# Patient Record
Sex: MALE | Race: BLACK OR AFRICAN AMERICAN | URBAN - METROPOLITAN AREA
[De-identification: names, ages, dates, MRNs, and addresses within clinical notes are randomized per-mention and may not be internally consistent; named-entity substitution may affect disease eponyms.]

---

## 2017-03-29 ENCOUNTER — OFFICE VISIT (OUTPATIENT)
Dept: FAMILY MEDICINE | Facility: CLINIC | Age: 22
End: 2017-03-29

## 2017-03-29 VITALS
DIASTOLIC BLOOD PRESSURE: 84 MMHG | RESPIRATION RATE: 18 BRPM | BODY MASS INDEX: 29.03 KG/M2 | WEIGHT: 196 LBS | SYSTOLIC BLOOD PRESSURE: 135 MMHG | HEIGHT: 69 IN | OXYGEN SATURATION: 96 % | HEART RATE: 62 BPM | TEMPERATURE: 98.4 F

## 2017-03-29 DIAGNOSIS — R36.9 PENILE DISCHARGE: Primary | ICD-10-CM

## 2017-03-29 DIAGNOSIS — S99.911S RIGHT ANKLE INJURY, SEQUELA: ICD-10-CM

## 2017-03-29 RX ORDER — AZITHROMYCIN 500 MG/1
1000 TABLET, FILM COATED ORAL ONCE
Qty: 2 TABLET | Refills: 0 | Status: SHIPPED | OUTPATIENT
Start: 2017-03-29 | End: 2017-03-29

## 2017-03-29 NOTE — PROGRESS NOTES
"       HPI       Justyn Jacome is a 21 year old  male  who presents for follow up. He says he went to [facility]- March 14-19 [facility] for spring break and had unprotected sex . He says he began having Whitish penile discharge that has been yellow green that started 2.5 days ago.  No fevers or chills. He denies any other sexual partners since spring break,           Patient Active Problem List    Diagnosis Date Noted     NO ACTIVE PROBLEMS 04/19/2016     Priority: Medium       Current Outpatient Prescriptions   Medication Sig Dispense Refill     Fexofenadine HCl (ALLEGRA PO) Take 30 mg by mouth          No Known Allergies          +++++++      Problem, Medication and Allergy Lists were reviewed and updated if needed..    Patient is a new patient to this clinic and so  I reviewed/updated the Past Medical History, the Family History and the Social History. .         Review of Systems:   General: No distress  + penile discharge  No fevers no chills  No rashes                Physical Exam:     Vitals:    03/29/17 1405   BP: 135/84   BP Location: Left arm   Patient Position: Chair   Cuff Size: Adult Regular   Pulse: 62   Resp: 18   Temp: 98.4  F (36.9  C)   TempSrc: Oral   SpO2: 96%   Weight: 196 lb (88.9 kg)   Height: 5' 8.5\" (174 cm)     Body mass index is 29.37 kg/(m^2).    Constitutional: Awake, alert, cooperative, no apparent distress, and appears stated age  HEENT: MMM, no conjunctival pallor  Pulm: CTAB, No rales, No wheeze, no increased effort of breathing.  CVS: RRR, No murmurs   GI: S NT ND BS +ve  Hem/Onc: No cervical LN patria  MSK: right ankle without tenderness or deformity.    Skin : no  rash  Neuro:  No focal neurological deficit  Psych : Good eye contact, well groomed, very interactive and collaborative. Good insight. Thought process is linear and coherent. Associations are tight. Mood is good. Affect euthymic.         No results found for this or any previous visit (from the past 24 " hour(s)).    Assessment and Plan      1. Penile discharge likely an STI. Will treat at this point due to symptoms. handy sent to pharmacy. Rocephin given in clinic. Labs ordered. Recommended  re testing for HIV in 1 month. Advised refraining from unprotected sex for 1 week.   - Anti Treponema  - HIV Antigen Antibody Combo  - azithromycin (ZITHROMAX) 500 MG tablet; Take 2 tablets (1,000 mg) by mouth once for 1 dose  Dispense: 2 tablet; Refill: 0  - INJECTION INTRAMUSCULAR OR SUB-Q  - cefTRIAXone (ROCEPHIN) 250 mg vial, IM (Charge)  - cefTRIAXone (ROCEPHIN) 250 MG injection; Inject 250 mg into the vein once for 1 dose  Dispense: 2.5 mL; Refill: 0  - NEISSERIA GONORRHOEA PCR; Future  - CHLAMYDIA TRACHOMATIS PCR; Future  - Urinalysis, Micro If (UA) ([facility]); Future    2. Right ankle injury, sequela per patient,. One year ago. Ordered xray to assess for possible healed fracture. 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     - X-ray rt ankle G/E 3 views*; Future      There are no discontinued medications.    Options for treatment and follow-up care were reviewed with the patient. Justyn Jacome  engaged in the decision making process and verbalized understanding of the options discussed and agreed with the final plan.    Karla Knight MD G3  [facility]  Family Medicine Resident  Pager [phone]

## 2017-03-29 NOTE — LETTER
[facility]   [address] [place] [address],  [address]  [facility] [address]  [phone]        2017    Justyn Jacome  [address] [place] [id number]  [phone] (home)     :     1995          To Whom it May Concern:    This patient missed school 2017 due to a clinic visit.    Please contact me for questions or concerns at [phone].    Sincerely,        Karla Knight MD

## 2017-03-29 NOTE — NURSING NOTE
The following medication was given:     MEDICATION: Rocephin 250mg and Lidocaine 1cc  ROUTE: IM  SITE: Deltoid - Left  DOSE: 250 mg  LOT #: [id number]  :  [employer]  EXPIRATION DATE:  6/1/19  NDC#: [id number]     Was entire vial of medication used? Yes    Dr. Decker onsite and available for questions at time of injection.   Problem list reviewed for plan for this injection.   Xiomara Russo CMA

## 2017-03-29 NOTE — MR AVS SNAPSHOT
"              After Visit Summary   3/29/2017    Justyn Jacome    MRN: [id number]           Patient Information     Date Of Birth          1995        Visit Information        Provider Department      3/29/2017 2:00 PM Karla Knight MD [facility]        Today's Diagnoses     Penile discharge    -  1    Right ankle injury, sequela           Follow-ups after your visit        Who to contact     Please call your clinic at [phone] to:    Ask questions about your health    Make or cancel appointments    Discuss your medicines    Learn about your test results    Speak to your doctor   If you have compliments or concerns about an experience at your clinic, or if you wish to file a complaint, please contact [facility] Patient Relations at [phone] or email us at [email or web address]         Additional Information About Your Visit        MyChart Information     [employer] gives you secure access to your electronic health record. If you see a primary care provider, you can also send messages to your care team and make appointments. If you have questions, please call your primary care clinic.  If you do not have a primary care provider, please call [phone] and they will assist you.      Info is an electronic gateway that provides easy, online access to your medical records. With Info, you can request a clinic appointment, read your test results, renew a prescription or communicate with your care team.     To access your existing account, please contact your [facility] or call [phone] for assistance.        Care EveryWhere ID     This is your Care EveryWhere ID. This could be used by other organizations to access your [facility] medical records  [id number]        Your Vitals Were     Pulse Temperature Respirations Height Pulse Oximetry BMI (Body Mass Index)    62 98.4  F (36.9  C) (Oral) 18 5' 8.5\" (174 cm) " 96% 29.37 kg/m2       Blood Pressure from Last 3 Encounters:   03/29/17 135/84   04/19/16 130/86    Weight from Last 3 Encounters:   03/29/17 196 lb (88.9 kg)   04/19/16 199 lb 9.6 oz (90.5 kg)              We Performed the Following     Anti Treponema     cefTRIAXone (ROCEPHIN) 250 mg vial, IM (Charge)     HIV Antigen Antibody Combo     INJECTION INTRAMUSCULAR OR SUB-Q          Today's Medication Changes          These changes are accurate as of: 3/29/17 11:59 PM.  If you have any questions, ask your nurse or doctor.               Start taking these medicines.        Dose/Directions    azithromycin 500 MG tablet   Commonly known as:  ZITHROMAX   Used for:  Penile discharge   Started by:  Karla Knight MD        Dose:  1000 mg   Take 2 tablets (1,000 mg) by mouth once for 1 dose   Quantity:  2 tablet   Refills:  0       cefTRIAXone 250 MG injection   Commonly known as:  ROCEPHIN   Used for:  Penile discharge   Started by:  Karla Knight MD        Dose:  250 mg   Inject 250 mg into the vein once for 1 dose   Quantity:  2.5 mL   Refills:  0            Where to get your medicines      These medications were sent to [facility] [address] [place] [address] AT [address] [address]    Hours:  24-hours Phone:  [phone]     azithromycin 500 MG tablet         Some of these will need a paper prescription and others can be bought over the counter.  Ask your nurse if you have questions.     You don't need a prescription for these medications     cefTRIAXone 250 MG injection                Primary Care Provider Office Phone # Fax #    Alex Shannon [phone]       [facility] [address] [facility] [address]        Thank you!     Thank you for choosing [facility]  for your care. Our goal is always to provide you with excellent care. Hearing back from our patients is one way we can continue to  improve our services. Please take a few minutes to complete the written survey that you may receive in the mail after your visit with us. Thank you!             Your Updated Medication List - Protect others around you: Learn how to safely use, store and throw away your medicines at www.disposemymeds.org.          This list is accurate as of: 3/29/17 11:59 PM.  Always use your most recent med list.                   Brand Name Dispense Instructions for use    ALLEGRA PO      Take 30 mg by mouth       azithromycin 500 MG tablet    ZITHROMAX    2 tablet    Take 2 tablets (1,000 mg) by mouth once for 1 dose       cefTRIAXone 250 MG injection    ROCEPHIN    2.5 mL    Inject 250 mg into the vein once for 1 dose

## 2017-03-29 NOTE — PROGRESS NOTES
Preceptor Attestation:   Patient seen and discussed with the resident. Assessment and plan reviewed with resident and agreed upon.   Supervising Physician:  Estelle Decker MD  [facility]

## 2017-03-30 DIAGNOSIS — R36.9 PENILE DISCHARGE: ICD-10-CM

## 2017-03-30 LAB
BILIRUBIN UR: NEGATIVE
BLOOD UR: NEGATIVE
GLUCOSE URINE: NEGATIVE
HIV 1+2 AB+HIV1 P24 AG SERPL QL IA: NORMAL
KETONES UR QL: NEGATIVE
LEUKOCYTE ESTERASE UR: NEGATIVE
NITRITE UR QL STRIP: NEGATIVE
PH UR STRIP: 5.5 [PH] (ref 5–7)
PROTEIN UR: NEGATIVE
SP GR UR STRIP: >=1.03
T PALLIDUM IGG+IGM SER QL: NEGATIVE
UROBILINOGEN UR STRIP-ACNC: NORMAL

## 2017-03-31 LAB
C TRACH DNA SPEC QL NAA+PROBE: NORMAL
N GONORRHOEA DNA SPEC QL NAA+PROBE: NORMAL
SPECIMEN SOURCE: NORMAL
SPECIMEN SOURCE: NORMAL

## 2017-04-03 RX ORDER — CEFTRIAXONE SODIUM 250 MG/1
250 INJECTION, POWDER, FOR SOLUTION INTRAMUSCULAR; INTRAVENOUS ONCE
Qty: 2.5 ML | Refills: 0 | OUTPATIENT
Start: 2017-04-03 | End: 2017-04-03

## 2017-04-04 ENCOUNTER — TELEPHONE (OUTPATIENT)
Dept: FAMILY MEDICINE | Facility: CLINIC | Age: 22
End: 2017-04-04

## 2017-04-04 NOTE — TELEPHONE ENCOUNTER
[facility] phone call message- general phone call:    Reason for call: Patient would like a copy of their xray done on 03/29/17    Return call needed: Yes    OK to leave a message on voice mail? Yes    Primary language: English      needed? No    Call taken on April 4, 2017 at 2:17 PM by Beverly Noonan

## 2020-03-10 ENCOUNTER — HEALTH MAINTENANCE LETTER (OUTPATIENT)
Age: 25
End: 2020-03-10

## 2020-12-27 ENCOUNTER — HEALTH MAINTENANCE LETTER (OUTPATIENT)
Age: 25
End: 2020-12-27

## 2021-04-24 ENCOUNTER — HEALTH MAINTENANCE LETTER (OUTPATIENT)
Age: 26
End: 2021-04-24

## 2021-10-09 ENCOUNTER — HEALTH MAINTENANCE LETTER (OUTPATIENT)
Age: 26
End: 2021-10-09

## 2022-05-16 ENCOUNTER — HEALTH MAINTENANCE LETTER (OUTPATIENT)
Age: 27
End: 2022-05-16

## 2022-09-11 ENCOUNTER — HEALTH MAINTENANCE LETTER (OUTPATIENT)
Age: 27
End: 2022-09-11

## 2023-06-03 ENCOUNTER — HEALTH MAINTENANCE LETTER (OUTPATIENT)
Age: 28
End: 2023-06-03